# Patient Record
(demographics unavailable — no encounter records)

---

## 2025-01-29 NOTE — CONSULT LETTER
[Dear  ___] : Dear  [unfilled], [Courtesy Letter:] : I had the pleasure of seeing your patient, [unfilled], in my office today. [Please see my note below.] : Please see my note below. [Consult Closing:] : Thank you very much for allowing me to participate in the care of this patient.  If you have any questions, please do not hesitate to contact me. [Sincerely,] : Sincerely, [FreeTextEntry3] : Kris Cole MD [facility]

## 2025-01-29 NOTE — HISTORY OF PRESENT ILLNESS
[FreeTextEntry1] : VISIT 2025 Last seen 2024 by Dr. Mix in [place] office Interval History: Continues on budesonide BID and received albuterol only once. Father reports he's had some  - Father reports history of facial rash after starting budesonide - this is now improved. - Attends . Recent ER visits/hospitalizations: denies Last oral steroid course: denies Recurrent cough or wheeze: denies when well Recurrent nocturnal cough or wheeze: denies when well Activity-induced symptoms: denies Daily meds: budesonide 0.25 mg BID Last used rescue: early 2024 Snoring: denies Received flu shot this year  VISIT 2024 - Used Budesonide 0.25 (did not switched to Flovent 44), using consistently (good technique). - Finished oral steroid burst in 2024 (respiratory distress signs in clinic). - Currently, doing well although infrequently sees increased work of breathing [usually with activity and lately with URI]. - Albuterol used for a few days recently due to increased work of breathing. Latest URI without cough but did trigger increased WOB. - Budesonide side effects reported, facial rash, although this was improved with "wiping face with wet cloth". - No frequent albuterol use or ER visits.  INITIAL HISTORY 2024 Reports recurring wheezing / chronic cough since starting . Increased work of breathing [on exam today] reported as his normal baseline. Colds with wheezing, now on Albuterol PRN. Infection with RSV triggered wheezing - Budesonide used x 1day, but not continued. Mother suspects asthma is etiology of wheezing. Father has pet allergies.  RESPIRATORY HISTORY - Symptoms with colds / exertion: +cough, +wheezing - ER visits: no. - Hospitalizations: no. - ENT-related issues (snoring / AOM): no AOM. - Allergies: no. - Oral steroids: x 1 course? - ASTHMA risk factors: +Father has allergies to pets. - Covid info: no.   - Exposures (smoke, pets): pet at home does not cause symptoms. - Delayed vaccinations: no. - Birth info: normal  course.

## 2025-01-29 NOTE — HISTORY OF PRESENT ILLNESS
[FreeTextEntry1] : VISIT 2025 Last seen 2024 by Dr. Mix in [place] office Interval History: Continues on budesonide BID and received albuterol only once. Father reports he's had some  - Father reports history of facial rash after starting budesonide - this is now improved. - Attends . Recent ER visits/hospitalizations: denies Last oral steroid course: denies Recurrent cough or wheeze: denies when well Recurrent nocturnal cough or wheeze: denies when well Activity-induced symptoms: denies Daily meds: budesonide 0.25 mg BID Last used rescue: early 2024 Snoring: denies Received flu shot this year  VISIT 2024 - Used Budesonide 0.25 (did not switched to Flovent 44), using consistently (good technique). - Finished oral steroid burst in 2024 (respiratory distress signs in clinic). - Currently, doing well although infrequently sees increased work of breathing [usually with activity and lately with URI]. - Albuterol used for a few days recently due to increased work of breathing. Latest URI without cough but did trigger increased WOB. - Budesonide side effects reported, facial rash, although this was improved with "wiping face with wet cloth". - No frequent albuterol use or ER visits.  INITIAL HISTORY 2024 Reports recurring wheezing / chronic cough since starting . Increased work of breathing [on exam today] reported as his normal baseline. Colds with wheezing, now on Albuterol PRN. Infection with RSV triggered wheezing - Budesonide used x 1day, but not continued. Mother suspects asthma is etiology of wheezing. Father has pet allergies.  RESPIRATORY HISTORY - Symptoms with colds / exertion: +cough, +wheezing - ER visits: no. - Hospitalizations: no. - ENT-related issues (snoring / AOM): no AOM. - Allergies: no. - Oral steroids: x 1 course? - ASTHMA risk factors: +Father has allergies to pets. - Covid info: no.   - Exposures (smoke, pets): pet at home does not cause symptoms. - Delayed vaccinations: no. - Birth info: normal  course.

## 2025-01-29 NOTE — ASSESSMENT
[FreeTextEntry1] : EVARISTO CHILEL is a 22 month M with history of recurrent cough/wheezing and previously diagnosed reactive airway disease, currently on budesonide BID, presenting for follow up - last seen 9/2024. Remains adherent to budesonide 0.25 with occasional missed doses. Overall doing well as per father. No recurrent cough or wheeze and no intercurrent ER visits or oral corticosteroids. No prior hospitalizations. Lungs today well aerated with clear lungs. Comfortable respiratory efforts at home without retractions or tachypnea, and no snoring at night. Plan to continue budesonide for the next two months at which time, if Evaristo is doing well, family can discontinue budesonide and use dynamically, meaning as needed with respiratory illnesses. Ample time spent discussing signs/symptoms to watch for once he comes off budesonide which might otherwise require re-initiation of daily budesonide.   Based on the above assessment, my recommendations are as follows: 1. Take 0.25 mg of budesonide via nebulization twice daily. 2. Can discontinue budesonide on/around April 1 if Evaristo continues to be well. 3. After discontinuation of budesonide, would restart budesonide 0.25 mg twice daily for upwards of 10-14 days or stop sooner when symptoms resolve. 4. Start albuterol, 2 puffs (or 1 vial via nebulization) via spacer every 4 - 6 hours as needed for cough, wheeze or difficulty breathing. 5. At the first sign of an illness, start albuterol 2-3x per day and increase as needed as per above. 6. Return to clinic in 4 months, or sooner as needed.  Discussed above assessment and management plan. Parent agreed with plan. All queries were answered.

## 2025-01-29 NOTE — DATA REVIEWED
[FreeTextEntry1] : I personally reviewed chart documentation - images (pertinent findings included into my note), including: - Dated 6/20/2024 from Lula Campos MD. - No images to review. [No studies available for review at this time.] : No studies available for review at this time.

## 2025-01-29 NOTE — REVIEW OF SYSTEMS
[NI] : Genitourinary  [Nl] : Endocrine [Immunizations are up to date] : Immunizations are up to date [Influenza Vaccine this Past Year] : influenza vaccine this past year [Snoring] : no snoring [Tachypnea] : not tachypneic [Wheezing] : no wheezing [Cough] : no cough [Shortness of Breath] : no shortness of breath

## 2025-01-29 NOTE — PHYSICAL EXAM
[Well Nourished] : well nourished [Well Developed] : well developed [Alert] : ~L alert [Active] : active [Normal Breathing Pattern] : normal breathing pattern [No Respiratory Distress] : no respiratory distress [No Allergic Shiners] : no allergic shiners [No Drainage] : no drainage [No Conjunctivitis] : no conjunctivitis [No Oral Pallor] : no oral pallor [No Oral Cyanosis] : no oral cyanosis [No Stridor] : no stridor [Absence Of Retractions] : absence of retractions [Symmetric] : symmetric [Good Expansion] : good expansion [No Acc Muscle Use] : no accessory muscle use [Equal Breath Sounds] : equal breath sounds bilaterally [No Crackles] : no crackles [No Rhonchi] : no rhonchi [Normal Sinus Rhythm] : normal sinus rhythm [No Heart Murmur] : no heart murmur [Soft, Non-Tender] : soft, non-tender [No Hepatosplenomegaly] : no hepatosplenomegaly [Non Distended] : was not ~L distended [Abdomen Mass (___ Cm)] : no abdominal mass palpated [Full ROM] : full range of motion [No Clubbing] : no clubbing [Capillary Refill < 2 secs] : capillary refill less than two seconds [No Cyanosis] : no cyanosis [No Petechiae] : no petechiae [No Kyphoscoliosis] : no kyphoscoliosis [No Contractures] : no contractures [Alert and  Oriented] : alert and oriented [No Abnormal Focal Findings] : no abnormal focal findings [Normal Muscle Tone And Reflexes] : normal muscle tone and reflexes [No Birth Marks] : no birth marks [No Rashes] : no rashes [No Skin Lesions] : no skin lesions [Good aeration to bases] : good aeration to bases [No Wheezing] : no wheezing [FreeTextEntry3] : external exam normal [FreeTextEntry4] : some dry secretions around nares [FreeTextEntry5] : external exam normal [FreeTextEntry7] : +good air exchange

## 2025-01-29 NOTE — CONSULT LETTER
[Dear  ___] : Dear  [unfilled], [Courtesy Letter:] : I had the pleasure of seeing your patient, [unfilled], in my office today. [Please see my note below.] : Please see my note below. [Consult Closing:] : Thank you very much for allowing me to participate in the care of this patient.  If you have any questions, please do not hesitate to contact me. [Sincerely,] : Sincerely, [FreeTextEntry3] : Kris Cole MD [facility]

## 2025-06-04 NOTE — CONSULT LETTER
[Dear  ___] : Dear  [unfilled], [Courtesy Letter:] : I had the pleasure of seeing your patient, [unfilled], in my office today. [Please see my note below.] : Please see my note below. [Consult Closing:] : Thank you very much for allowing me to participate in the care of this patient.  If you have any questions, please do not hesitate to contact me. [Sincerely,] : Sincerely, [FreeTextEntry3] : Kris Cole MD [facility]

## 2025-06-04 NOTE — HISTORY OF PRESENT ILLNESS
[FreeTextEntry1] : 2025 Interval History: - Has been well since last visit. Using budesonide and albuterol only as needed since last visit. No recurrent wheezing. - Stopped budesonide BID end of March and has been on PRN budesonide since. - Has had some colds/URIs recently and mother has started budesonide/albuterol - on average for 1-2 days - Started with cough this morning - wet/mucusy cough with congestion Recent ER visits/hospitalizations: denies Last oral steroid course: denies since last visit Recurrent cough or wheeze: denies when well Recurrent nocturnal cough or wheeze: denies when well  2025 Last seen 2024 by Dr. Mix in [place] office Interval History: Continues on budesonide BID and received albuterol only once. Father reports he's had some  - Father reports history of facial rash after starting budesonide - this is now improved. - Attends . Recent ER visits/hospitalizations: denies Last oral steroid course: denies Recurrent cough or wheeze: denies when well Recurrent nocturnal cough or wheeze: denies when well Activity-induced symptoms: denies Daily meds: budesonide 0.25 mg BID Last used rescue: early 2024 Snoring: denies Received flu shot this year  2024 - Used Budesonide 0.25 (did not switched to Flovent 44), using consistently (good technique). - Finished oral steroid burst in 2024 (respiratory distress signs in clinic). - Currently, doing well although infrequently sees increased work of breathing [usually with activity and lately with URI]. - Albuterol used for a few days recently due to increased work of breathing. Latest URI without cough but did trigger increased WOB. - Budesonide side effects reported, facial rash, although this was improved with "wiping face with wet cloth". - No frequent albuterol use or ER visits.  INITIAL HISTORY 2024 Reports recurring wheezing / chronic cough since starting . Increased work of breathing [on exam today] reported as his normal baseline. Colds with wheezing, now on Albuterol PRN. Infection with RSV triggered wheezing - Budesonide used x 1day, but not continued. Mother suspects asthma is etiology of wheezing. Father has pet allergies.  RESPIRATORY HISTORY - Symptoms with colds / exertion: +cough, +wheezing - ER visits: no. - Hospitalizations: no. - ENT-related issues (snoring / AOM): no AOM. - Allergies: no. - Oral steroids: x 1 course? - ASTHMA risk factors: +Father has allergies to pets. - Covid info: no.   - Exposures (smoke, pets): pet at home does not cause symptoms. - Delayed vaccinations: no. - Birth info: normal  course.

## 2025-06-04 NOTE — HISTORY OF PRESENT ILLNESS
[FreeTextEntry1] : 2025 Interval History: - Has been well since last visit. Using budesonide and albuterol only as needed since last visit. No recurrent wheezing. - Stopped budesonide BID end of March and has been on PRN budesonide since. - Has had some colds/URIs recently and mother has started budesonide/albuterol - on average for 1-2 days - Started with cough this morning - wet/mucusy cough with congestion Recent ER visits/hospitalizations: denies Last oral steroid course: denies since last visit Recurrent cough or wheeze: denies when well Recurrent nocturnal cough or wheeze: denies when well  2025 Last seen 2024 by Dr. Mix in [place] office Interval History: Continues on budesonide BID and received albuterol only once. Father reports he's had some  - Father reports history of facial rash after starting budesonide - this is now improved. - Attends . Recent ER visits/hospitalizations: denies Last oral steroid course: denies Recurrent cough or wheeze: denies when well Recurrent nocturnal cough or wheeze: denies when well Activity-induced symptoms: denies Daily meds: budesonide 0.25 mg BID Last used rescue: early 2024 Snoring: denies Received flu shot this year  2024 - Used Budesonide 0.25 (did not switched to Flovent 44), using consistently (good technique). - Finished oral steroid burst in 2024 (respiratory distress signs in clinic). - Currently, doing well although infrequently sees increased work of breathing [usually with activity and lately with URI]. - Albuterol used for a few days recently due to increased work of breathing. Latest URI without cough but did trigger increased WOB. - Budesonide side effects reported, facial rash, although this was improved with "wiping face with wet cloth". - No frequent albuterol use or ER visits.  INITIAL HISTORY 2024 Reports recurring wheezing / chronic cough since starting . Increased work of breathing [on exam today] reported as his normal baseline. Colds with wheezing, now on Albuterol PRN. Infection with RSV triggered wheezing - Budesonide used x 1day, but not continued. Mother suspects asthma is etiology of wheezing. Father has pet allergies.  RESPIRATORY HISTORY - Symptoms with colds / exertion: +cough, +wheezing - ER visits: no. - Hospitalizations: no. - ENT-related issues (snoring / AOM): no AOM. - Allergies: no. - Oral steroids: x 1 course? - ASTHMA risk factors: +Father has allergies to pets. - Covid info: no.   - Exposures (smoke, pets): pet at home does not cause symptoms. - Delayed vaccinations: no. - Birth info: normal  course.

## 2025-06-04 NOTE — HISTORY OF PRESENT ILLNESS
[FreeTextEntry1] : 2025 Interval History: - Has been well since last visit. Using budesonide and albuterol only as needed since last visit. No recurrent wheezing. - Stopped budesonide BID end of March and has been on PRN budesonide since. - Has had some colds/URIs recently and mother has started budesonide/albuterol - on average for 1-2 days - Started with cough this morning - wet/mucusy cough with congestion Recent ER visits/hospitalizations: denies Last oral steroid course: denies since last visit Recurrent cough or wheeze: denies when well Recurrent nocturnal cough or wheeze: denies when well  2025 Last seen 2024 by Dr. Mix in [place] office Interval History: Continues on budesonide BID and received albuterol only once. Father reports he's had some  - Father reports history of facial rash after starting budesonide - this is now improved. - Attends . Recent ER visits/hospitalizations: denies Last oral steroid course: denies Recurrent cough or wheeze: denies when well Recurrent nocturnal cough or wheeze: denies when well Activity-induced symptoms: denies Daily meds: budesonide 0.25 mg BID Last used rescue: early 2024 Snoring: denies Received flu shot this year  2024 - Used Budesonide 0.25 (did not switched to Flovent 44), using consistently (good technique). - Finished oral steroid burst in 2024 (respiratory distress signs in clinic). - Currently, doing well although infrequently sees increased work of breathing [usually with activity and lately with URI]. - Albuterol used for a few days recently due to increased work of breathing. Latest URI without cough but did trigger increased WOB. - Budesonide side effects reported, facial rash, although this was improved with "wiping face with wet cloth". - No frequent albuterol use or ER visits.  INITIAL HISTORY 2024 Reports recurring wheezing / chronic cough since starting . Increased work of breathing [on exam today] reported as his normal baseline. Colds with wheezing, now on Albuterol PRN. Infection with RSV triggered wheezing - Budesonide used x 1day, but not continued. Mother suspects asthma is etiology of wheezing. Father has pet allergies.  RESPIRATORY HISTORY - Symptoms with colds / exertion: +cough, +wheezing - ER visits: no. - Hospitalizations: no. - ENT-related issues (snoring / AOM): no AOM. - Allergies: no. - Oral steroids: x 1 course? - ASTHMA risk factors: +Father has allergies to pets. - Covid info: no.   - Exposures (smoke, pets): pet at home does not cause symptoms. - Delayed vaccinations: no. - Birth info: normal  course.

## 2025-06-04 NOTE — CONSULT LETTER
[Dear  ___] : Dear  [unfilled], [Courtesy Letter:] : I had the pleasure of seeing your patient, [unfilled], in my office today. [Please see my note below.] : Please see my note below. [Consult Closing:] : Thank you very much for allowing me to participate in the care of this patient.  If you have any questions, please do not hesitate to contact me. [Sincerely,] : Sincerely, [FreeTextEntry3] : Kris Cole MD [facility]

## 2025-06-04 NOTE — CONSULT LETTER
[Dear  ___] : Dear  [unfilled], [Courtesy Letter:] : I had the pleasure of seeing your patient, [unfilled], in my office today. [Please see my note below.] : Please see my note below. [Consult Closing:] : Thank you very much for allowing me to participate in the care of this patient.  If you have any questions, please do not hesitate to contact me. [Sincerely,] : Sincerely, [FreeTextEntry3] : Kris Cole MD [facility]

## 2025-06-04 NOTE — PHYSICAL EXAM
[Well Nourished] : well nourished [Well Developed] : well developed [Alert] : ~L alert [Active] : active [Normal Breathing Pattern] : normal breathing pattern [No Respiratory Distress] : no respiratory distress [No Allergic Shiners] : no allergic shiners [No Drainage] : no drainage [No Conjunctivitis] : no conjunctivitis [No Oral Pallor] : no oral pallor [No Oral Cyanosis] : no oral cyanosis [No Stridor] : no stridor [Absence Of Retractions] : absence of retractions [Symmetric] : symmetric [Good Expansion] : good expansion [No Acc Muscle Use] : no accessory muscle use [Good aeration to bases] : good aeration to bases [Equal Breath Sounds] : equal breath sounds bilaterally [No Crackles] : no crackles [No Rhonchi] : no rhonchi [No Wheezing] : no wheezing [Normal Sinus Rhythm] : normal sinus rhythm [No Heart Murmur] : no heart murmur [Soft, Non-Tender] : soft, non-tender [Non Distended] : was not ~L distended [Full ROM] : full range of motion [No Clubbing] : no clubbing [Capillary Refill < 2 secs] : capillary refill less than two seconds [No Cyanosis] : no cyanosis [No Kyphoscoliosis] : no kyphoscoliosis [No Contractures] : no contractures [Alert and  Oriented] : alert and oriented [No Abnormal Focal Findings] : no abnormal focal findings [Normal Muscle Tone And Reflexes] : normal muscle tone and reflexes [No Rashes] : no rashes [FreeTextEntry3] : external exam normal [FreeTextEntry4] : some dry secretions around nares [FreeTextEntry5] : external exam normal [FreeTextEntry7] : +good air exchange